# Patient Record
Sex: FEMALE | Race: WHITE | NOT HISPANIC OR LATINO | URBAN - METROPOLITAN AREA
[De-identification: names, ages, dates, MRNs, and addresses within clinical notes are randomized per-mention and may not be internally consistent; named-entity substitution may affect disease eponyms.]

---

## 2024-01-08 ENCOUNTER — HOSPITAL ENCOUNTER (EMERGENCY)
Facility: OTHER | Age: 44
Discharge: HOME | End: 2024-01-08
Attending: EMERGENCY MEDICINE

## 2024-01-08 VITALS
SYSTOLIC BLOOD PRESSURE: 104 MMHG | OXYGEN SATURATION: 98 % | DIASTOLIC BLOOD PRESSURE: 54 MMHG | RESPIRATION RATE: 16 BRPM | BODY MASS INDEX: 20.04 KG/M2 | TEMPERATURE: 98 F | HEIGHT: 70 IN | HEART RATE: 56 BPM | WEIGHT: 140 LBS

## 2024-01-08 DIAGNOSIS — N30.01 ACUTE CYSTITIS WITH HEMATURIA: Primary | ICD-10-CM

## 2024-01-08 DIAGNOSIS — K59.00 CONSTIPATION, UNSPECIFIED CONSTIPATION TYPE: ICD-10-CM

## 2024-01-08 LAB
B-HCG UR QL: NEGATIVE
BACTERIA #/AREA URNS HPF: ABNORMAL /HPF
BACTERIA GENITAL QL WET PREP: ABNORMAL
BILIRUB UR QL STRIP: NEGATIVE
CAOX CRY URNS QL MICRO: ABNORMAL
CLARITY UR: ABNORMAL
CLUE CELLS VAG QL WET PREP: ABNORMAL
COLOR UR: YELLOW
CTP QC/QA: YES
FILAMENT FUNGI VAG WET PREP-#/AREA: ABNORMAL
GLUCOSE UR QL STRIP: NEGATIVE
HGB UR QL STRIP: ABNORMAL
HYALINE CASTS #/AREA URNS LPF: 0 /LPF
KETONES UR QL STRIP: NEGATIVE
LEUKOCYTE ESTERASE UR QL STRIP: NEGATIVE
MICROSCOPIC COMMENT: ABNORMAL
NITRITE UR QL STRIP: NEGATIVE
PH UR STRIP: 6 [PH] (ref 5–8)
PROT UR QL STRIP: ABNORMAL
RBC #/AREA URNS HPF: >100 /HPF (ref 0–4)
SP GR UR STRIP: 1.02 (ref 1–1.03)
SPECIMEN SOURCE: ABNORMAL
SQUAMOUS #/AREA URNS HPF: 1 /HPF
T VAGINALIS GENITAL QL WET PREP: ABNORMAL
URN SPEC COLLECT METH UR: ABNORMAL
UROBILINOGEN UR STRIP-ACNC: 1 EU/DL
WBC #/AREA URNS HPF: 3 /HPF (ref 0–5)
WBC #/AREA VAG WET PREP: ABNORMAL
YEAST GENITAL QL WET PREP: ABNORMAL

## 2024-01-08 PROCEDURE — 81025 URINE PREGNANCY TEST: CPT | Performed by: PHYSICIAN ASSISTANT

## 2024-01-08 PROCEDURE — 81000 URINALYSIS NONAUTO W/SCOPE: CPT | Performed by: PHYSICIAN ASSISTANT

## 2024-01-08 PROCEDURE — 87491 CHLMYD TRACH DNA AMP PROBE: CPT | Performed by: PHYSICIAN ASSISTANT

## 2024-01-08 PROCEDURE — 87086 URINE CULTURE/COLONY COUNT: CPT | Performed by: PHYSICIAN ASSISTANT

## 2024-01-08 PROCEDURE — 87210 SMEAR WET MOUNT SALINE/INK: CPT | Performed by: PHYSICIAN ASSISTANT

## 2024-01-08 PROCEDURE — 99284 EMERGENCY DEPT VISIT MOD MDM: CPT | Mod: 25

## 2024-01-08 RX ORDER — CEPHALEXIN 500 MG/1
500 CAPSULE ORAL EVERY 12 HOURS
Qty: 14 CAPSULE | Refills: 0 | Status: SHIPPED | OUTPATIENT
Start: 2024-01-08 | End: 2024-01-15

## 2024-01-08 RX ORDER — CLOTRIMAZOLE 1 %
CREAM (GRAM) TOPICAL
Qty: 15 G | Refills: 0 | Status: SHIPPED | OUTPATIENT
Start: 2024-01-08

## 2024-01-08 NOTE — Clinical Note
"Mira "Mira" Guerra was seen and treated in our emergency department on 1/8/2024.  She may return to work on 01/10/2024.       If you have any questions or concerns, please don't hesitate to call.      Christy Begum PA"

## 2024-01-09 NOTE — FIRST PROVIDER EVALUATION
Emergency Department TeleTriage Encounter Note      CHIEF COMPLAINT    Chief Complaint   Patient presents with    Hematuria     C/o blood in urine x 1 day and burning after urination        VITAL SIGNS   Initial Vitals [01/08/24 1948]   BP Pulse Resp Temp SpO2   (!) 142/73 77 16 97.9 °F (36.6 °C) 100 %      MAP       --            ALLERGIES    Review of patient's allergies indicates:  No Known Allergies    PROVIDER TRIAGE NOTE  This is a teletriage evaluation of a 43 y.o. female presenting to the ED complaining of hematuria. Patient reports dysuria starting last night. She noticed hematuria this afternoon. She has history of UTI but no kidney stones. She denies back pain or fever.    Patient is alert and oriented. She speaks in complete sentences. She is sitting upright in the chair in no distress.     Initial orders will be placed and care will be transferred to an alternate provider when patient is roomed for a full evaluation. Any additional orders and the final disposition will be determined by that provider.         ORDERS  Labs Reviewed   URINALYSIS, REFLEX TO URINE CULTURE   POCT URINE PREGNANCY       ED Orders (720h ago, onward)      Start Ordered     Status Ordering Provider    01/08/24 1954 01/08/24 1953  Urinalysis, Reflex to Urine Culture Urine, Clean Catch  STAT         Ordered JADIEL PRICE.    01/08/24 1954 01/08/24 1953  POCT urine pregnancy  Once         Ordered JADIEL PRICE              Virtual Visit Note: The provider triage portion of this emergency department evaluation and documentation was performed via [employer], a HIPAA-compliant telemedicine application, in concert with a tele-presenter in the room. A face to face patient evaluation with one of my colleagues will occur once the patient is placed in an emergency department room.      DISCLAIMER: This note was prepared with [employer] voice recognition transcription software. Garbled syntax, mangled pronouns, and other bizarre  constructions may be attributed to that software system.

## 2024-01-10 LAB
BACTERIA UR CULT: NO GROWTH
C TRACH DNA SPEC QL NAA+PROBE: NOT DETECTED
N GONORRHOEA DNA SPEC QL NAA+PROBE: NOT DETECTED

## 2024-01-11 NOTE — ED PROVIDER NOTES
Encounter Date: 1/8/2024       History     Chief Complaint   Patient presents with    Hematuria     C/o blood in urine x 1 day and burning after urination      Afebrile 43-year-old female with reported uterine cancer, depression anxiety and daily Suboxone use presents the ED for evaluation of hematuria and dysuria.  Patient reports that symptoms began approximately 1 day ago.  She also reports some urinary frequency.  She states some low back pain however denies any radiation to flank.  Denies any vaginal bleeding.  She has not tried any medications for symptoms.  She does not currently follow with UROGYN        Review of patient's allergies indicates:  No Known Allergies  Past Medical History:   Diagnosis Date    Anxiety disorder, unspecified     Depression     Uterine cancer      Past Surgical History:   Procedure Laterality Date    PARTIAL HYSTERECTOMY      TUBAL LIGATION       History reviewed. No pertinent family history.  Social History     Tobacco Use    Smoking status: Former     Types: Cigarettes    Smokeless tobacco: Former     Review of Systems  As per HPI  Physical Exam     Initial Vitals [01/08/24 1948]   BP Pulse Resp Temp SpO2   (!) 142/73 77 16 97.9 °F (36.6 °C) 100 %      MAP       --         Physical Exam    Nursing note and vitals reviewed.  Constitutional: Vital signs are normal. She appears well-developed and well-nourished. She is cooperative.  Non-toxic appearance. She does not appear ill. No distress.   HENT:   Head: Normocephalic and atraumatic.   Eyes: Conjunctivae and lids are normal.   Neck: Trachea normal. Neck supple. No stridor present. No tracheal deviation present.   Normal range of motion.  Cardiovascular:  Normal rate and regular rhythm.           Pulmonary/Chest: Breath sounds normal. No respiratory distress. She has no wheezes. She has no rhonchi.   Abdominal: Abdomen is soft. There is no abdominal tenderness.   No right CVA tenderness.  No left CVA tenderness. There is no  rebound and no guarding.   Genitourinary:    Genitourinary Comments:  exam with slight discharge.  No cervical motion tenderness.  No adnexal tenderness.  Slight beefy red appearance of external labia majora.  No lesions     Musculoskeletal:      Cervical back: Normal range of motion and neck supple.     Neurological: She is alert and oriented to person, place, and time. GCS eye subscore is 4. GCS verbal subscore is 5. GCS motor subscore is 6.   Skin: Skin is warm, dry and intact. No rash noted.   Psychiatric: She has a normal mood and affect. Her speech is normal and behavior is normal. Thought content normal.         ED Course   Procedures  Labs Reviewed   VAGINAL SCREEN - Abnormal; Notable for the following components:       Result Value    WBC - Vaginal Screen Occasional (*)     Bacteria - Vaginal Screen Few (*)     All other components within normal limits    Narrative:     Release to patient->Immediate   URINALYSIS, REFLEX TO URINE CULTURE - Abnormal; Notable for the following components:    Appearance, UA Hazy (*)     Protein, UA 1+ (*)     Occult Blood UA 3+ (*)     All other components within normal limits    Narrative:     Specimen Source->Urine   URINALYSIS MICROSCOPIC - Abnormal; Notable for the following components:    RBC, UA >100 (*)     Bacteria Few (*)     All other components within normal limits    Narrative:     Specimen Source->Urine   CULTURE, URINE   CULTURE, URINE    Narrative:     Specimen Source->Urine   C. TRACHOMATIS/N. GONORRHOEAE BY AMP DNA    Narrative:     Sources by Resulting Lab:->[facility]  Release to patient->Immediate   POCT URINE PREGNANCY          Imaging Results              CT Renal Stone Study ABD Pelvis WO (Final result)  Result time 01/08/24 22:35:10      Final result by Fadia Bhatia MD (01/08/24 22:35:10)                   Impression:      1. No evidence of hydronephrosis/obstructive uropathy or nephrolithiasis.  2. Mild circumferential bladder wall thickening which  can be seen with incomplete distention or cystitis.  Correlation with urinalysis advised.  3. Moderate to large volume of fecal material throughout the colon.  4. Trace free pelvic fluid.  Additional findings as above.      Electronically signed by: Fadia Bhatia MD  Date:    01/08/2024  Time:    22:35               Narrative:    EXAMINATION:  CT RENAL STONE STUDY ABD PELVIS WO    CLINICAL HISTORY:  Flank pain, kidney stone suspected;    TECHNIQUE:  Low dose axial images, sagittal and coronal reformations were obtained from the lung bases to the pubic symphysis.  Contrast was not administered.    COMPARISON:  None    FINDINGS:  The visualized lung bases are free of pleural fluid or focal consolidation noting lingular, right middle lobe and right lower lobe atelectatic change.  Visualized portions of the heart and pericardium are within normal limits.    Please note evaluation of solid organ parenchyma is limited due to lack of IV contrast.  The liver, spleen, pancreas and adrenal glands demonstrate an unremarkable noncontrast CT appearance.  No calcified stones in the gallbladder lumen.    The kidneys are normal in size and location with an unremarkable noncontrast CT appearance.  There is no hydroureteronephrosis or nephroureterolithiasis.  Small calcification in the right pelvis presumably a phlebolith.  Urinary bladder demonstrates slight wall thickening which could be secondary to incomplete distension although correlation with urinalysis for infectious process advised.  The uterus is not definitively visualized, likely surgically absent.  Adnexal regions are within normal limits.  Trace free pelvic fluid present.    The abdominal aorta is nonaneurysmal.  There are shotty periaortic lymph nodes present.    Visualized loops of large and small bowel demonstrate no evidence of obstruction or inflammatory change.  The appendix is unremarkable.  There is no free intraperitoneal air or portal venous gas.  Moderate  volume of fecal material throughout the colon.    The visualized osseous structures are intact.  Very small fat containing umbilical hernia.                                       Medications - No data to display  Medical Decision Making  Amount and/or Complexity of Data Reviewed  Labs: ordered. Decision-making details documented in ED Course.  Radiology: ordered.    Risk  Prescription drug management.               ED Course as of 01/10/24 2152   Mon Jan 08, 2024   2133 WBC - Vaginal Screen(!): Occasional [MG]      ED Course User Index  [MG] Uyen Liriano MD               Medical Decision Making:   Initial Assessment:   Emergent evaluation 42-year-old female presenting with  complaint.  Has a history of reported uterine cancer.  Unable to visualize previous surgical intervention and care.  She appears well and nontoxic.   exam grossly unremarkable this time  Differential Diagnosis:   Acute cystitis, pyelonephritis, bladder cancer, renal cell carcinoma, nephrolithiasis, Candida vaginitis, BV  Clinical Tests:   Lab Tests: Ordered and Reviewed  Radiological Study: Reviewed and Ordered  ED Management:  Plan for UA and given the gross hematuria discussed symptoms most likely related to acute hemorrhagic cystitis however as she has smoking history and history of uterine cancer will obtain renal CT to evaluate for any acute process.  Wet prep unremarkable.  G/C pending however low suspicion of PID given overall symptomatology and presentation.  CT reveals circumferential bladder wall thickening.  This is likely related to cystitis however given her smoking history encouraged to follow-up with your gyn here.  She will be sent home with Keflex.  We will also place on topical antifungal for suspected tinea cruris             Clinical Impression:  Final diagnoses:  [N30.01] Acute cystitis with hematuria (Primary)  [K59.00] Constipation, unspecified constipation type          ED Disposition Condition    Discharge  Stable          ED Prescriptions       Medication Sig Dispense Start Date End Date Auth. Provider    cephALEXin (KEFLEX) 500 MG capsule Take 1 capsule (500 mg total) by mouth every 12 (twelve) hours. for 7 days 14 capsule 1/8/2024 1/15/2024 Christy Begum PA    clotrimazole (LOTRIMIN) 1 % cream Apply to affected area 2 times daily for one week 15 g 1/8/2024 -- Christy Begum PA          Follow-up Information       Follow up With Specialties Details Why Contact Info Additional Information    [facility] Obstetrics [facility] Gynecology Schedule an appointment as soon as possible for a visit   [address]  [facility] [address] [phone] [facility] - [facility], 1st Floor Please park in [place]    [facility] Urology Schedule an appointment as soon as possible for a visit   [address], [address]  [facility] [address] [phone] Urology - [facility], 6th Floor, [address] Patients seeing Dr. Rodríguez, please check in at [facility] [address]. Please park in the [place]. Please come with a full bladder to in office visit to provide a urine specimen when you arrive.    [facility] [place] [facility] Primary [facility] Primary Care Schedule an appointment as soon as possible for a visit   [address]  [facility] [id number] [phone]              Christy Begum PA  01/10/24 215